# Patient Record
Sex: FEMALE | Race: WHITE | NOT HISPANIC OR LATINO | RURAL
[De-identification: names, ages, dates, MRNs, and addresses within clinical notes are randomized per-mention and may not be internally consistent; named-entity substitution may affect disease eponyms.]

---

## 2024-02-08 ENCOUNTER — NEW PATIENT (OUTPATIENT)
Dept: RURAL CLINIC 2 | Facility: CLINIC | Age: 64
End: 2024-02-08

## 2024-02-08 DIAGNOSIS — H25.813: ICD-10-CM

## 2024-02-08 DIAGNOSIS — H52.223: ICD-10-CM

## 2024-02-08 DIAGNOSIS — H52.03: ICD-10-CM

## 2024-02-08 DIAGNOSIS — H52.4: ICD-10-CM

## 2024-02-08 PROCEDURE — 92015 DETERMINE REFRACTIVE STATE: CPT

## 2024-02-08 PROCEDURE — 92004 COMPRE OPH EXAM NEW PT 1/>: CPT

## 2024-02-08 ASSESSMENT — TONOMETRY
OD_IOP_MMHG: 16
OS_IOP_MMHG: 16

## 2024-02-08 ASSESSMENT — VISUAL ACUITY
OS_SC: 20/80-1
OD_SC: 20/80-1
OD_PH: 20/40
OS_PH: 20/40

## 2025-04-24 ENCOUNTER — COMPREHENSIVE EXAM (OUTPATIENT)
Age: 65
End: 2025-04-24

## 2025-07-15 ENCOUNTER — TELEPHONE (OUTPATIENT)
Age: 65
End: 2025-07-15

## 2025-07-15 NOTE — TELEPHONE ENCOUNTER
Attempted to call patient regarding appointment scheduled for 9/4.  Patient self-scheduled, however, we do not accept [place] Medicaid as a billable insurance.  Would need to confirm if patient has another insurance or would consider self-pay.  Unable to leave message, mailbox full.

## 2025-09-05 ENCOUNTER — OFFICE VISIT (OUTPATIENT)
Age: 65
End: 2025-09-05

## 2025-09-05 VITALS — BODY MASS INDEX: 25.99 KG/M2 | HEIGHT: 64 IN | WEIGHT: 152.2 LBS

## 2025-09-05 DIAGNOSIS — M25.651 DECREASED RANGE OF RIGHT HIP MOVEMENT: ICD-10-CM

## 2025-09-05 DIAGNOSIS — M25.551 RIGHT HIP PAIN: Primary | ICD-10-CM

## 2025-09-05 DIAGNOSIS — M87.9 OSTEONECROSIS OF HIP WITH COLLAPSE OF FEMORAL HEAD PRESENT ON X-RAY (HCC): ICD-10-CM

## 2025-09-05 RX ORDER — SUZETRIGINE 50 MG/1
50 TABLET, FILM COATED ORAL SEE ADMIN INSTRUCTIONS
Qty: 30 TABLET | Refills: 0 | Status: SHIPPED | OUTPATIENT
Start: 2025-09-05